# Patient Record
Sex: FEMALE | Race: WHITE | Employment: UNEMPLOYED | URBAN - METROPOLITAN AREA
[De-identification: names, ages, dates, MRNs, and addresses within clinical notes are randomized per-mention and may not be internally consistent; named-entity substitution may affect disease eponyms.]

---

## 2020-08-27 ENCOUNTER — OFFICE VISIT (OUTPATIENT)
Dept: CARDIOLOGY CLINIC | Age: 65
End: 2020-08-27
Payer: COMMERCIAL

## 2020-08-27 VITALS
WEIGHT: 133 LBS | DIASTOLIC BLOOD PRESSURE: 78 MMHG | HEIGHT: 65 IN | RESPIRATION RATE: 16 BRPM | HEART RATE: 71 BPM | BODY MASS INDEX: 22.16 KG/M2 | SYSTOLIC BLOOD PRESSURE: 108 MMHG

## 2020-08-27 PROCEDURE — 99204 OFFICE O/P NEW MOD 45 MIN: CPT | Performed by: INTERNAL MEDICINE

## 2020-08-27 PROCEDURE — 93000 ELECTROCARDIOGRAM COMPLETE: CPT | Performed by: INTERNAL MEDICINE

## 2020-08-27 RX ORDER — ZOLEDRONIC ACID 5 MG/100ML
5 INJECTION, SOLUTION INTRAVENOUS ONCE
COMMUNITY

## 2020-08-27 NOTE — PROGRESS NOTES
file     Non-medical: Not on file   Tobacco Use    Smoking status: Former Smoker     Packs/day: 0.50     Years: 10.00     Pack years: 5.00     Types: Cigarettes     Last attempt to quit: 1980     Years since quittin.8    Smokeless tobacco: Never Used   Substance and Sexual Activity    Alcohol use: Yes     Comment: social    Drug use: Not on file    Sexual activity: Not on file   Lifestyle    Physical activity     Days per week: Not on file     Minutes per session: Not on file    Stress: Not on file   Relationships    Social connections     Talks on phone: Not on file     Gets together: Not on file     Attends Holiness service: Not on file     Active member of club or organization: Not on file     Attends meetings of clubs or organizations: Not on file     Relationship status: Not on file    Intimate partner violence     Fear of current or ex partner: Not on file     Emotionally abused: Not on file     Physically abused: Not on file     Forced sexual activity: Not on file   Other Topics Concern    Not on file   Social History Narrative    Not on file       Family History   Problem Relation Age of Onset    Heart Surgery Mother     Coronary Art Dis Mother     Heart Attack Brother    Patient's mother had a myocardial infarction at age 46 and her brother at age 54    Past Medical History:   Diagnosis Date    Abnormal stress test        PHYSICAL EXAM:  CONSTITUTIONAL:  Well developed, well nourished    Vitals:    20 0910   BP: 108/78   Pulse: 71   Resp: 16   Weight: 133 lb (60.3 kg)   Height: 5' 5\" (1.651 m)     HEAD & FACE: Normocephalic. Symmetric. EYES: No xanthelasma. Conjunctivae not injected. EARS, NOSE, MOUTH & THROAT: Good dentition. No oral pallor or cyanosis. NECK: No JVD at 30 degrees. No thyromegaly. RESPIRATORY: Clear to auscultation and percussion in all fields. No use of accessory muscle or intercostal retractions. CARDIOVASCULAR: Regular rate and rhythm.   No lifts or thrills on palpitation. Auscultation with normal S1-S2 in intensity and splitting. No carotid bruits. Abdominal aorta not enlarged. Femoral arteries without bruits. Pedal pulses 2+. No edema. ABDOMEN: Soft without hepatic or splenic enlargement. No tenderness. MUSCULOSKELETAL: No kyphosis or scoliosis of the back. Good muscle strength and tone. No muscle atrophy. Normal gait and ability to undergo exercise stress testing. EXTREMITIES: No clubbing or cyanosis. SKIN: No Xanthomas or ulcerations. NEUROLOGIC: Oriented to time, place and person. Normal mood and affect. LYMPHATIC:  No palpable neck or supraclavicular nodes. No splenomegaly. EKG: the EKG tracing was reviewed and found to reveal: Normal sinus rhythm. No change compared to prior tracing. ASSESSMENT:                                                     ORDERS:       Diagnosis Orders   1. Abnormal EKG  EKG 12 Lead    Cardiac Stress Test Exercise - Treadmill   2. Family history of early CAD       Above assessment cardiac issues stable. PLAN:   See above orders. Medication reconciliation completed. Old records were reviewed and found to reveal:  noted on 8/17/2020 labs. Calculated 10-year cardiac risk profile 3.9%. Lifestyle changes recommended per ACC guidelines. .  Discussed issues that would prompt earlier evaluation. Same medications. Follow-up office visit: Pending above evaluation.

## 2020-09-10 ENCOUNTER — HOSPITAL ENCOUNTER (OUTPATIENT)
Dept: CARDIOLOGY | Age: 65
Discharge: HOME | End: 2020-09-10
Payer: MEDICARE

## 2020-09-10 VITALS — SYSTOLIC BLOOD PRESSURE: 108 MMHG | TEMPERATURE: 97.8 F | DIASTOLIC BLOOD PRESSURE: 62 MMHG

## 2020-09-10 PROCEDURE — 93017 CV STRESS TEST TRACING ONLY: CPT

## 2020-09-10 NOTE — PROCEDURES
[address] [facility] [address] [phone]    Exercise Stress Study (non-nuclear)      Name: [facility] Account Number:  [de-identified]      :  1955          Sex: female         Date of Study:  9/10/2020                    Ordering Provider: Bret Harp. Joycelyn Navarro MD          PCP: Gui Tomlin MD    Cardiologist: Bret Harp. Joycelyn Navarro MD              Interpreting Physician: Yi Contreras. Kevan Paiz DO    Indication:   Detecting the presence and location of coronary artery disease    Clinical History:   Patient has no known history of coronary artery disease. Resting ECG:    Sinus rhythm, 69 bpm.  Normal axis. Poor R wave progression. Decreased voltage in the chest and high lateral leads. Exercise: The patient exercised using a Maurice protocol, completing 12:30 minutes and reaching an estimated work load of 53.8 metabolic equivalents (METS). Resting HR was 69. Peak exercise heart rate was 142 ( 92% of maximum predicted heart rate for age). Baseline /62. Peak exercise /70. The blood pressure response to exercise was normal      Exercise was terminated due to heart rate attained with minimal exercise fatigue, and mild increased breathing. .     The patient experienced no chest pain with exercise. Exercise ECG:   The patient demonstrated no arrhythmias during exercise. With exercise, there were no ST segment changes of significance at the heart rate achieved. Youssef treadmill score was 12.5 implying low risk. Impression:    1. Exercise EKG was  negative. 2. The patient experienced no chest pain with exercise. 3. Youssef treadmill score was 12.5 implying low risk. 4. Exercise capacity was above average. 5. Low risk general exercise treadmill test.    Thank you for sending your patient to this [employer].     Electronically signed by Chico Franz DO on 9/10/20 at 2:58 PM EDT

## 2020-09-11 ENCOUNTER — TELEPHONE (OUTPATIENT)
Dept: CARDIOLOGY CLINIC | Age: 65
End: 2020-09-11

## 2020-09-11 NOTE — TELEPHONE ENCOUNTER
----- Message from Gabby Mendoza MD sent at 9/10/2020  3:42 PM EDT -----  Please let patient know that stress test was normal n/a

## 2020-09-14 NOTE — TELEPHONE ENCOUNTER
Patient notified and instructed on stress test results per  [employer]. She stated understanding.

## 2022-04-27 ENCOUNTER — HOSPITAL ENCOUNTER (OUTPATIENT)
Age: 67
Discharge: HOME | End: 2022-04-29

## 2022-04-28 ENCOUNTER — HOSPITAL ENCOUNTER (OUTPATIENT)
Age: 67
Discharge: HOME | End: 2022-04-30

## 2022-04-28 LAB
ANION GAP SERPL CALCULATED.3IONS-SCNC: 11 MMOL/L (ref 7–16)
BUN BLDV-MCNC: 9 MG/DL (ref 6–23)
CALCIUM SERPL-MCNC: 8.3 MG/DL (ref 8.6–10.2)
CHLORIDE BLD-SCNC: 100 MMOL/L (ref 98–107)
CO2: 23 MMOL/L (ref 22–29)
CREAT SERPL-MCNC: 0.7 MG/DL (ref 0.5–1)
GFR AFRICAN AMERICAN: >60
GFR NON-AFRICAN AMERICAN: >60 ML/MIN/1.73
GLUCOSE BLD-MCNC: 93 MG/DL (ref 74–99)
HCT VFR BLD CALC: 38.5 % (ref 34–48)
HEMOGLOBIN: 12.5 G/DL (ref 11.5–15.5)
MCH RBC QN AUTO: 29.4 PG (ref 26–35)
MCHC RBC AUTO-ENTMCNC: 32.5 % (ref 32–34.5)
MCV RBC AUTO: 90.6 FL (ref 80–99.9)
PDW BLD-RTO: 15.3 FL (ref 11.5–15)
PLATELET # BLD: 132 E9/L (ref 130–450)
PMV BLD AUTO: 12 FL (ref 7–12)
POTASSIUM SERPL-SCNC: 4.5 MMOL/L (ref 3.5–5)
RBC # BLD: 4.25 E12/L (ref 3.5–5.5)
SODIUM BLD-SCNC: 134 MMOL/L (ref 132–146)
WBC # BLD: 7.9 E9/L (ref 4.5–11.5)

## 2022-04-28 PROCEDURE — 88302 TISSUE EXAM BY PATHOLOGIST: CPT

## 2022-04-28 PROCEDURE — 80048 BASIC METABOLIC PNL TOTAL CA: CPT

## 2022-04-28 PROCEDURE — 85027 COMPLETE CBC AUTOMATED: CPT

## 2025-07-21 ENCOUNTER — HOSPITAL ENCOUNTER (OUTPATIENT)
Age: 70
Discharge: HOME | End: 2025-07-23

## 2025-07-21 PROCEDURE — 87046 STOOL CULTR AEROBIC BACT EA: CPT

## 2025-07-21 PROCEDURE — 82705 FATS/LIPIDS FECES QUAL: CPT

## 2025-07-21 PROCEDURE — 87045 FECES CULTURE AEROBIC BACT: CPT

## 2025-07-21 PROCEDURE — 87324 CLOSTRIDIUM AG IA: CPT

## 2025-07-21 PROCEDURE — 89190 NASAL SMEAR FOR EOSINOPHILS: CPT

## 2025-07-21 PROCEDURE — 83630 LACTOFERRIN FECAL (QUAL): CPT

## 2025-07-21 PROCEDURE — 83986 ASSAY PH BODY FLUID NOS: CPT

## 2025-07-21 PROCEDURE — 87427 SHIGA-LIKE TOXIN AG IA: CPT

## 2025-07-21 PROCEDURE — 88305 TISSUE EXAM BY PATHOLOGIST: CPT

## 2025-07-21 PROCEDURE — 87449 NOS EACH ORGANISM AG IA: CPT

## 2025-07-21 PROCEDURE — 87329 GIARDIA AG IA: CPT

## 2025-07-22 LAB
C DIFF GDH + TOXINS A+B STL QL IA.RAPID: NEGATIVE
EOSINOPHIL # BLD: 0 10*3/UL
G LAMBLIA AG STL QL IA: NEGATIVE
LACTOFERRIN STL QL: NEGATIVE
SOURCE: NORMAL
SPECIMEN DESCRIPTION: NORMAL
SPECIMEN DESCRIPTION: NORMAL

## 2025-07-23 LAB
MICROORGANISM SPEC CULT: NORMAL
MICROORGANISM SPEC CULT: NORMAL
SPECIMEN DESCRIPTION: NORMAL
SURGICAL PATHOLOGY REPORT: NORMAL

## 2025-07-24 LAB
FAT QUALITATIVE SPLIT STOOL: NORMAL
FECAL NEUTRAL FAT: NORMAL
PH STL: 8 [PH] (ref 5–8.5)